# Patient Record
Sex: FEMALE | Race: WHITE | URBAN - METROPOLITAN AREA
[De-identification: names, ages, dates, MRNs, and addresses within clinical notes are randomized per-mention and may not be internally consistent; named-entity substitution may affect disease eponyms.]

---

## 2022-03-23 ENCOUNTER — OUTSIDE SERVICES (OUTPATIENT)
Dept: PODIATRY | Age: 57
End: 2022-03-23
Payer: COMMERCIAL

## 2022-03-23 DIAGNOSIS — S81.811A LACERATION OF RIGHT LOWER EXTREMITY, INITIAL ENCOUNTER: Primary | ICD-10-CM

## 2022-03-23 PROCEDURE — 99203 OFFICE O/P NEW LOW 30 MIN: CPT | Performed by: PODIATRIST

## 2023-10-10 ENCOUNTER — TELEPHONE (OUTPATIENT)
Dept: ORTHOPEDIC SURGERY | Facility: CLINIC | Age: 58
End: 2023-10-10
Payer: COMMERCIAL

## 2023-10-10 DIAGNOSIS — G89.4 CHRONIC PAIN SYNDROME: Primary | ICD-10-CM

## 2023-10-10 DIAGNOSIS — M79.18 MYOFASCIAL PAIN SYNDROME: ICD-10-CM

## 2023-10-10 DIAGNOSIS — M17.0 PRIMARY LOCALIZED OSTEOARTHRITIS OF KNEES, BILATERAL: ICD-10-CM

## 2023-10-10 DIAGNOSIS — M50.00 CERVICAL DISC DISORDER WITH MYELOPATHY: ICD-10-CM

## 2023-10-10 RX ORDER — LIDOCAINE HYDROCHLORIDE 20 MG/ML
1.25 SOLUTION OROPHARYNGEAL AS NEEDED
COMMUNITY
Start: 2022-07-12

## 2023-10-10 RX ORDER — GLYCERIN 1 G/1
1 SUPPOSITORY RECTAL DAILY
COMMUNITY
Start: 2023-01-13

## 2023-10-10 RX ORDER — AMPICILLIN TRIHYDRATE 500 MG
50 CAPSULE ORAL DAILY
COMMUNITY
Start: 2023-07-24

## 2023-10-10 RX ORDER — POTASSIUM CHLORIDE 20 MEQ/1
20 TABLET, EXTENDED RELEASE ORAL DAILY
COMMUNITY
Start: 2023-09-24

## 2023-10-10 RX ORDER — LORAZEPAM 0.5 MG/1
5 TABLET ORAL EVERY 6 HOURS PRN
COMMUNITY
End: 2023-10-25

## 2023-10-10 RX ORDER — DICLOFENAC SODIUM 10 MG/G
4 GEL TOPICAL 4 TIMES DAILY PRN
COMMUNITY
Start: 2023-07-17

## 2023-10-10 RX ORDER — ACETAMINOPHEN 500 MG
500 TABLET ORAL EVERY 6 HOURS PRN
COMMUNITY

## 2023-10-10 RX ORDER — PROCHLORPERAZINE MALEATE 10 MG
10 TABLET ORAL EVERY 6 HOURS PRN
COMMUNITY
Start: 2023-07-13

## 2023-10-10 RX ORDER — METOPROLOL SUCCINATE 25 MG/1
25 TABLET, EXTENDED RELEASE ORAL 2 TIMES DAILY
COMMUNITY

## 2023-10-10 RX ORDER — LIDOCAINE 4 G/100G
2 PATCH TOPICAL DAILY PRN
COMMUNITY
Start: 2023-07-17

## 2023-10-10 RX ORDER — ESTRADIOL 0.1 MG/G
2 CREAM VAGINAL DAILY
COMMUNITY
Start: 2022-11-08

## 2023-10-10 RX ORDER — ASPIRIN 81 MG/1
81 TABLET ORAL DAILY
COMMUNITY
Start: 2023-09-24

## 2023-10-10 RX ORDER — ALBUTEROL SULFATE 90 UG/1
1 AEROSOL, METERED RESPIRATORY (INHALATION) EVERY 4 HOURS PRN
COMMUNITY
Start: 2015-02-15

## 2023-10-10 RX ORDER — LOSARTAN POTASSIUM 25 MG/1
25 TABLET ORAL DAILY
COMMUNITY
Start: 2023-07-21

## 2023-10-10 RX ORDER — FLUTICASONE PROPIONATE 50 MCG
1 SPRAY, SUSPENSION (ML) NASAL DAILY
COMMUNITY
Start: 2023-09-18

## 2023-10-10 RX ORDER — DEXTROMETHORPHAN HB/DOXYLAMINE 15-6.25/15
1 SOLUTION, ORAL ORAL DAILY
COMMUNITY

## 2023-10-10 RX ORDER — MULTIVIT-MIN/FA/LYCOPEN/LUTEIN .4-300-25
1 TABLET ORAL DAILY
COMMUNITY
Start: 2023-06-26

## 2023-10-10 RX ORDER — NITROGLYCERIN 0.4 MG/1
0.4 TABLET SUBLINGUAL EVERY 5 MIN PRN
COMMUNITY

## 2023-10-10 RX ORDER — CYCLOBENZAPRINE HCL 10 MG
10 TABLET ORAL 3 TIMES DAILY PRN
COMMUNITY
Start: 2023-07-17

## 2023-10-10 RX ORDER — NALOXONE HYDROCHLORIDE 4 MG/.1ML
4 SPRAY NASAL AS NEEDED
COMMUNITY
Start: 2022-11-11

## 2023-10-10 RX ORDER — FLUCONAZOLE 100 MG/1
150 TABLET ORAL ONCE
COMMUNITY
Start: 2023-03-29

## 2023-10-10 RX ORDER — VENLAFAXINE 25 MG/1
25 TABLET ORAL 3 TIMES DAILY
COMMUNITY
Start: 2022-04-12

## 2023-10-10 RX ORDER — AMLODIPINE BESYLATE 5 MG/1
5 TABLET ORAL DAILY
COMMUNITY
Start: 2023-08-02

## 2023-10-10 RX ORDER — PANTOPRAZOLE SODIUM 40 MG/1
40 TABLET, DELAYED RELEASE ORAL
COMMUNITY
Start: 2022-08-26

## 2023-10-10 RX ORDER — METOPROLOL SUCCINATE 50 MG/1
1 TABLET, EXTENDED RELEASE ORAL DAILY
COMMUNITY
Start: 2012-07-25

## 2023-10-10 RX ORDER — SUMATRIPTAN SUCCINATE 25 MG/1
25 TABLET ORAL EVERY 2 HOUR PRN
COMMUNITY
End: 2024-03-11 | Stop reason: SDUPTHER

## 2023-10-10 RX ORDER — LORATADINE 10 MG/1
10 TABLET ORAL DAILY
COMMUNITY

## 2023-10-10 RX ORDER — SODIUM HYALURONATE INTRA-ARTICULAR SOLN PREF SYR 25 MG/2.5ML 25/2.5 MG/ML
20 SOLUTION PREFILLED SYRINGE INTRAARTICULAR
COMMUNITY
Start: 2022-12-12

## 2023-10-10 RX ORDER — OXYCODONE AND ACETAMINOPHEN 5; 325 MG/1; MG/1
1 TABLET ORAL EVERY 4 HOURS PRN
COMMUNITY
End: 2023-10-10 | Stop reason: SDUPTHER

## 2023-10-10 RX ORDER — OXYCODONE AND ACETAMINOPHEN 5; 325 MG/1; MG/1
2 TABLET ORAL EVERY 6 HOURS PRN
Qty: 240 TABLET | Refills: 0 | Status: SHIPPED | OUTPATIENT
Start: 2023-10-10 | End: 2023-10-11 | Stop reason: SDUPTHER

## 2023-10-10 RX ORDER — ACETAMINOPHEN AND CODEINE PHOSPHATE 300; 30 MG/1; MG/1
1 TABLET ORAL EVERY 6 HOURS PRN
COMMUNITY

## 2023-10-10 RX ORDER — ATORVASTATIN CALCIUM 20 MG/1
20 TABLET, FILM COATED ORAL DAILY
COMMUNITY

## 2023-10-10 RX ORDER — LACTOBACILLUS ACIDOPHILUS/PECT 75 MM-100
1 CAPSULE ORAL DAILY
COMMUNITY
Start: 2023-09-23
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
## 2023-10-11 ENCOUNTER — TELEPHONE (OUTPATIENT)
Dept: ORTHOPEDIC SURGERY | Facility: CLINIC | Age: 58
End: 2023-10-11
Payer: COMMERCIAL

## 2023-10-11 DIAGNOSIS — G89.4 CHRONIC PAIN SYNDROME: ICD-10-CM

## 2023-10-11 DIAGNOSIS — M50.00 CERVICAL DISC DISORDER WITH MYELOPATHY: ICD-10-CM

## 2023-10-11 DIAGNOSIS — M17.0 PRIMARY LOCALIZED OSTEOARTHRITIS OF KNEES, BILATERAL: ICD-10-CM

## 2023-10-11 DIAGNOSIS — M79.18 MYOFASCIAL PAIN SYNDROME: ICD-10-CM

## 2023-10-11 RX ORDER — OXYCODONE AND ACETAMINOPHEN 5; 325 MG/1; MG/1
2 TABLET ORAL EVERY 6 HOURS PRN
Qty: 240 TABLET | Refills: 0 | Status: SHIPPED | OUTPATIENT
Start: 2023-10-11 | End: 2023-10-31 | Stop reason: SDUPTHER

## 2023-10-11 NOTE — TELEPHONE ENCOUNTER
Ok I sent same Rx there.  Hope she can get there..  The message to clinic was just on a post-it note on Josefa's desk early this morning. We couldn't tell when she called.  staff here is new and didn't know who took it

## 2023-10-22 DIAGNOSIS — G44.86 CERVICOGENIC HEADACHE: ICD-10-CM

## 2023-10-22 NOTE — LETTER
October 25, 2023     Taryn Gonzalez  [address]  [facility] [address]    Patient: Taryn Gonzalez   YOB: 1965   Date of Visit: 10/22/2023       Dear Allyson -     I'm sorry to hear that your phone was disconnected.  Jen and I have been trying to reach you to schedule a follow-up.  I'm required by the [facility] medical board and  policy to see you at least once every 90 days if I am to continue prescribing controlled substances.  Please schedule a follow-up visit at your convenience before November 18th.  Jen and our new nurse Josefa Figueroa have my permission to overbook you into my schedule to accommodate this.  If you are unable to see me before then, I will start weaning your dose of all controlled substances so you can safely come off of them within about 60 days.  This would include oxycodone, lorazepam, etc.      Sincerely,     Tesfaye Messina MD

## 2023-10-25 RX ORDER — LORAZEPAM 0.5 MG/1
TABLET ORAL
Qty: 60 TABLET | Refills: 0 | Status: SHIPPED | OUTPATIENT
Start: 2023-10-25 | End: 2023-10-31 | Stop reason: SDUPTHER

## 2023-10-25 NOTE — TELEPHONE ENCOUNTER
Rx sent with note to pharmacist to tell patient we need to schedule appt before any more refills.  I'm trying to follow state board guidelines but she cannot take calls - per previous messages her phone was disconnected.  I'll send an letter by post mail also.   Josefa - please print the draft I routed you and mail to patient from office

## 2023-10-31 ENCOUNTER — TELEPHONE (OUTPATIENT)
Dept: ORTHOPEDIC SURGERY | Facility: CLINIC | Age: 58
End: 2023-10-31
Payer: COMMERCIAL

## 2023-10-31 DIAGNOSIS — M79.18 MYOFASCIAL PAIN SYNDROME: ICD-10-CM

## 2023-10-31 DIAGNOSIS — G89.4 CHRONIC PAIN SYNDROME: ICD-10-CM

## 2023-10-31 DIAGNOSIS — M17.0 PRIMARY LOCALIZED OSTEOARTHRITIS OF KNEES, BILATERAL: ICD-10-CM

## 2023-10-31 DIAGNOSIS — G44.86 CERVICOGENIC HEADACHE: ICD-10-CM

## 2023-10-31 DIAGNOSIS — M50.00 CERVICAL DISC DISORDER WITH MYELOPATHY: ICD-10-CM

## 2023-10-31 RX ORDER — OXYCODONE AND ACETAMINOPHEN 5; 325 MG/1; MG/1
2 TABLET ORAL EVERY 6 HOURS PRN
Qty: 240 TABLET | Refills: 0 | Status: SHIPPED | OUTPATIENT
Start: 2023-10-31 | End: 2023-12-08

## 2023-10-31 RX ORDER — LORAZEPAM 0.5 MG/1
TABLET ORAL
Qty: 90 TABLET | Refills: 0 | Status: SHIPPED | OUTPATIENT
Start: 2023-11-10 | End: 2023-12-08

## 2023-11-01 NOTE — TELEPHONE ENCOUNTER
DUTCHI -  No further action needed now  Pt returned my call about refills and need for apt.   Very hard for her to get rides and limited phone access so missed recent apts.  I told her I would overbook next time at [place], even that day she called, and gave her dates the next 3weeks when I'm there 6, 13 and 20th.   But told her it would have to be a very fast focused apt. She mentioned at least 3 different problems but wants to focus on head pain. Also Wanted more frequent Sig on lorazepam even tho not taking that much, just in case she gets admitted or incarcerated, they'll know she can have more and she'd need it then.  Also says percocet was shorted by pharmacy on recent supply and I veirfied that 10/11 she only get 191 tabs instead of 240. I reminded her that she's supposed to be tapering very soon anyways but again said she has intolerable pain and gave multiple reasons.   Had made 3 future apts  so will discuss then.   No further action needed now

## 2023-11-08 ENCOUNTER — TELEPHONE (OUTPATIENT)
Dept: ORTHOPEDIC SURGERY | Facility: CLINIC | Age: 58
End: 2023-11-08
Payer: COMMERCIAL

## 2023-11-27 ENCOUNTER — TELEPHONE (OUTPATIENT)
Dept: ORTHOPEDIC SURGERY | Facility: CLINIC | Age: 58
End: 2023-11-27
Payer: COMMERCIAL

## 2023-11-27 NOTE — TELEPHONE ENCOUNTER
"Returned pt phone call on Nov 22 but forgot to document until now - she had called  directly and they gave me a post-it note instead of epic message.  She had said \"urgent\".  After a very long and rambling story about her family and house situation she told me that she thinks her pain and skin color changes are due to infection with \"Spider mites\" and live adult stink bugs crawling all over inside her body.  She had already called her infectious disease doctor at [facility] and was waiting for callback from him on same day.  I politely told her that this would not be my area of expertise, but I highly doubted that diagnosis.  I did tell her that it may be a paranoia, and that after she gets more testing (if directed by ID) that she should consider talking to a psychiatrist, which she flatly refused.   I also told her she could tell her primary care doctor about this but she also refused saying I'm the only one who would listen to her.    She said was still able to do work around the house and go to the store.     She also mentioned that she thinks she hit her head again on the beam in her hallway, like she does \"all the time\" and this may explain her headaches.  But could not recall any specific event recently.     [facility] \"[facility]\" [facility] on Lorazepam, but she able to make 60tabs last Oct 25-Mid November.  Taking 3/d at most sometimes less.    She seemed level-headed and able to care for herself, fully oriented otherwise so I didn't send her to the ED.   But I did reminder her that I needed to see her in office in order to continue prescribing her meds, and reminded my nurse and  that they are allowed to overbook her into my schedule anytime she can get a ride to office.  I also told her that if I didn't see her by Dec 18th as per previous plan I would start weaning her off of her controlled substances.  I reminded her of upcoming apts  "

## 2023-12-07 DIAGNOSIS — M50.00 CERVICAL DISC DISORDER WITH MYELOPATHY: ICD-10-CM

## 2023-12-07 DIAGNOSIS — M79.18 MYOFASCIAL PAIN SYNDROME: ICD-10-CM

## 2023-12-07 DIAGNOSIS — M17.0 PRIMARY LOCALIZED OSTEOARTHRITIS OF KNEES, BILATERAL: ICD-10-CM

## 2023-12-07 DIAGNOSIS — G89.4 CHRONIC PAIN SYNDROME: ICD-10-CM

## 2023-12-07 DIAGNOSIS — G44.86 CERVICOGENIC HEADACHE: ICD-10-CM

## 2023-12-08 RX ORDER — OXYCODONE AND ACETAMINOPHEN 5; 325 MG/1; MG/1
2 TABLET ORAL EVERY 6 HOURS PRN
Qty: 240 TABLET | Refills: 0 | Status: SHIPPED | OUTPATIENT
Start: 2023-12-08 | End: 2024-01-02 | Stop reason: SDUPTHER

## 2023-12-08 RX ORDER — LORAZEPAM 0.5 MG/1
TABLET ORAL
Qty: 90 TABLET | Refills: 0 | Status: SHIPPED | OUTPATIENT
Start: 2023-12-08 | End: 2024-01-17

## 2023-12-29 ENCOUNTER — TELEPHONE (OUTPATIENT)
Dept: ORTHOPEDIC SURGERY | Facility: CLINIC | Age: 58
End: 2023-12-29
Payer: COMMERCIAL

## 2023-12-29 DIAGNOSIS — M79.18 MYOFASCIAL PAIN SYNDROME: ICD-10-CM

## 2023-12-29 DIAGNOSIS — M50.00 CERVICAL DISC DISORDER WITH MYELOPATHY: ICD-10-CM

## 2023-12-29 DIAGNOSIS — G89.4 CHRONIC PAIN SYNDROME: ICD-10-CM

## 2023-12-29 DIAGNOSIS — M17.0 PRIMARY LOCALIZED OSTEOARTHRITIS OF KNEES, BILATERAL: ICD-10-CM

## 2023-12-29 NOTE — TELEPHONE ENCOUNTER
New phone number [phone]  Pt left voicemail on my personal phoneat ~1:20pm  (the one I told her never to call again), but said she was without cell phone again until just now and needs medication advice .  Said she was not able to call office but  didn't say why.   Medication supply looks OK thru next week but will probably run out before next visit on [facility].  There was no urgency in her message  Josefa - I hate to bother  you but need to remind this patient of proper channels and appropriate communications.  She should NEVER be calling my personal number.  Please reassure her that I'll give refills when due, but if serious questions make virtual visit for next week (Tues or Weds OK) and let me know if anhything more urgent.  Keep Stef 10 apt we can use in case she needs physical exam, as Id still very much prefer to see her in person. . New ph # above, please update demographics, I dont know how and still at [facility] seeing consults.  Crazy day again

## 2024-01-02 RX ORDER — OXYCODONE AND ACETAMINOPHEN 5; 325 MG/1; MG/1
2 TABLET ORAL EVERY 6 HOURS PRN
Qty: 240 TABLET | Refills: 0 | Status: SHIPPED | OUTPATIENT
Start: 2024-01-02 | End: 2024-01-31
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
## 2024-01-04 ENCOUNTER — TELEMEDICINE (OUTPATIENT)
Dept: ORTHOPEDIC SURGERY | Facility: CLINIC | Age: 59
End: 2024-01-04
Payer: COMMERCIAL

## 2024-01-04 DIAGNOSIS — G89.4 CHRONIC PAIN SYNDROME: Primary | ICD-10-CM

## 2024-01-04 PROCEDURE — 99213 OFFICE O/P EST LOW 20 MIN: CPT | Performed by: PHYSICAL MEDICINE & REHABILITATION
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
## 2024-01-10 ENCOUNTER — OFFICE VISIT (OUTPATIENT)
Dept: ORTHOPEDIC SURGERY | Facility: CLINIC | Age: 59
End: 2024-01-10
Payer: COMMERCIAL

## 2024-01-10 ENCOUNTER — TELEPHONE (OUTPATIENT)
Dept: ORTHOPEDIC SURGERY | Facility: CLINIC | Age: 59
End: 2024-01-10

## 2024-01-10 DIAGNOSIS — M17.0 PRIMARY OSTEOARTHRITIS OF BOTH KNEES: ICD-10-CM

## 2024-01-10 DIAGNOSIS — G89.4 CHRONIC PAIN SYNDROME: Primary | ICD-10-CM

## 2024-01-10 DIAGNOSIS — M50.00 HERNIATION OF INTERVERTEBRAL DISC OF CERVICAL SPINE WITH MYELOPATHY: ICD-10-CM

## 2024-01-10 DIAGNOSIS — M75.111 NONTRAUMATIC INCOMPLETE TEAR OF RIGHT ROTATOR CUFF: ICD-10-CM

## 2024-01-10 PROCEDURE — 99442 PR PHYS/QHP TELEPHONE EVALUATION 11-20 MIN: CPT | Performed by: PHYSICAL MEDICINE & REHABILITATION
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
## 2024-01-17 DIAGNOSIS — G44.86 CERVICOGENIC HEADACHE: ICD-10-CM

## 2024-01-17 RX ORDER — LORAZEPAM 0.5 MG/1
TABLET ORAL
Qty: 90 TABLET | Refills: 0 | Status: SHIPPED | OUTPATIENT
Start: 2024-01-17 | End: 2024-01-19 | Stop reason: SDUPTHER

## 2024-01-19 ENCOUNTER — TELEPHONE (OUTPATIENT)
Dept: ORTHOPEDIC SURGERY | Facility: CLINIC | Age: 59
End: 2024-01-19
Payer: COMMERCIAL

## 2024-01-19 DIAGNOSIS — G44.86 CERVICOGENIC HEADACHE: ICD-10-CM

## 2024-01-19 RX ORDER — LORAZEPAM 0.5 MG/1
TABLET ORAL
Qty: 90 TABLET | Refills: 0 | Status: SHIPPED | OUTPATIENT
Start: 2024-01-19 | End: 2024-03-01 | Stop reason: SDUPTHER

## 2024-01-19 NOTE — TELEPHONE ENCOUNTER
----- Message from Josefa Guo RN sent at 1/19/2024 12:51 PM EST -----  Taryn Mosher called and deeply apologetic for her previous behavior and did not mean any disrespect to you.  Of course, it is close to time for a refill. Please advise what you would like me to do so I can call her back.  Thank you,  Josefa

## 2024-01-19 NOTE — TELEPHONE ENCOUNTER
Starting phone encounter for documentation.  She's not due for anything and I still can't prescribe that much oxycodone and benzo without seeing her and screening urine. The only safe thing to do is continue taper.  And that was plan for her also.  I refilled Lorazepam last week but looks like she didn't fill it yet - I;ll send again just to be sure.   That's probably what she wants.     BUT Please tell her to wean percocet to 6 tabs per day and see me in office.  She got #240 percocet just 2 weeks ago.    If she can't see me we'll keep weaning down.  I'll squeeze her in anytime I'm in clinic - at her convenience to re-evaluate and discuss further.

## 2024-01-31 DIAGNOSIS — M17.0 PRIMARY LOCALIZED OSTEOARTHRITIS OF KNEES, BILATERAL: ICD-10-CM

## 2024-01-31 DIAGNOSIS — M50.00 CERVICAL DISC DISORDER WITH MYELOPATHY: ICD-10-CM

## 2024-01-31 DIAGNOSIS — G89.4 CHRONIC PAIN SYNDROME: ICD-10-CM

## 2024-01-31 DIAGNOSIS — M79.18 MYOFASCIAL PAIN SYNDROME: ICD-10-CM

## 2024-01-31 RX ORDER — OXYCODONE AND ACETAMINOPHEN 5; 325 MG/1; MG/1
1 TABLET ORAL EVERY 4 HOURS PRN
Qty: 180 TABLET | Refills: 0 | Status: SHIPPED | OUTPATIENT
Start: 2024-01-31 | End: 2024-03-01 | Stop reason: SDUPTHER

## 2024-02-05 ENCOUNTER — TELEPHONE (OUTPATIENT)
Dept: ORTHOPEDIC SURGERY | Facility: CLINIC | Age: 59
End: 2024-02-05
Payer: COMMERCIAL

## 2024-02-05 NOTE — TELEPHONE ENCOUNTER
No pedialyte would need to come from her PCP.  I'll look at low back next time I see her but she has option of going to PCP or ED

## 2024-02-12 ENCOUNTER — APPOINTMENT (OUTPATIENT)
Dept: ORTHOPEDIC SURGERY | Facility: CLINIC | Age: 59
End: 2024-02-12
Payer: COMMERCIAL

## 2024-02-19 ENCOUNTER — TELEPHONE (OUTPATIENT)
Dept: ORTHOPEDIC SURGERY | Facility: CLINIC | Age: 59
End: 2024-02-19
Payer: COMMERCIAL

## 2024-02-19 NOTE — TELEPHONE ENCOUNTER
"Called to schedule patient for a face to face appointment with Dr Messina and was informed that she is \"house bound\" and cannot walk. Patient encouraged to go to the nearest emergency room.    "

## 2024-02-20 ENCOUNTER — TELEPHONE (OUTPATIENT)
Dept: OPERATING ROOM | Facility: CLINIC | Age: 59
End: 2024-02-20
Payer: COMMERCIAL

## 2024-02-20 DIAGNOSIS — G44.86 CERVICOGENIC HEADACHE: ICD-10-CM

## 2024-02-20 DIAGNOSIS — M79.18 MYOFASCIAL PAIN SYNDROME: ICD-10-CM

## 2024-02-20 DIAGNOSIS — M17.0 PRIMARY LOCALIZED OSTEOARTHRITIS OF KNEES, BILATERAL: ICD-10-CM

## 2024-02-20 DIAGNOSIS — M50.00 CERVICAL DISC DISORDER WITH MYELOPATHY: ICD-10-CM

## 2024-02-20 DIAGNOSIS — G89.4 CHRONIC PAIN SYNDROME: ICD-10-CM
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
## 2024-02-20 NOTE — TELEPHONE ENCOUNTER
Josefa Figueroa said she is calling multiple times per day.  The ortho clinic isn't even supposed to take these calls.    Please tell her that I won't go down any further on her meds, but she's not due until March 1st at earliest and I WILL NOT (tell her it's capitalized), give any earlier than that.  And if she does't come in person to next visit I'll need to keep weaning down.  She needs to stretch whatever she has left

## 2024-02-27 ENCOUNTER — TELEPHONE (OUTPATIENT)
Dept: OPERATING ROOM | Facility: CLINIC | Age: 59
End: 2024-02-27
Payer: COMMERCIAL

## 2024-02-27 NOTE — TELEPHONE ENCOUNTER
She wants to know if she can do a virtual on a friends phone? She cannot walk and tailbone on both sides of butt - she fell on wood floor.  I thought you needed to see her in  person.   Wanted to see you tomorrow told her you were in [facility]

## 2024-02-29 ENCOUNTER — APPOINTMENT (OUTPATIENT)
Dept: ORTHOPEDIC SURGERY | Facility: CLINIC | Age: 59
End: 2024-02-29
Payer: COMMERCIAL

## 2024-02-29 DIAGNOSIS — M50.00 CERVICAL DISC DISORDER WITH MYELOPATHY: ICD-10-CM

## 2024-02-29 DIAGNOSIS — M17.0 PRIMARY LOCALIZED OSTEOARTHRITIS OF KNEES, BILATERAL: ICD-10-CM

## 2024-02-29 DIAGNOSIS — G89.4 CHRONIC PAIN SYNDROME: ICD-10-CM

## 2024-02-29 DIAGNOSIS — M79.18 MYOFASCIAL PAIN SYNDROME: ICD-10-CM

## 2024-02-29 DIAGNOSIS — G44.86 CERVICOGENIC HEADACHE: ICD-10-CM

## 2024-03-01 RX ORDER — OXYCODONE AND ACETAMINOPHEN 5; 325 MG/1; MG/1
TABLET ORAL
Qty: 180 TABLET | Refills: 0 | OUTPATIENT
Start: 2024-03-01

## 2024-03-01 RX ORDER — OXYCODONE AND ACETAMINOPHEN 5; 325 MG/1; MG/1
1 TABLET ORAL EVERY 4 HOURS PRN
Qty: 180 TABLET | Refills: 0 | Status: SHIPPED | OUTPATIENT
Start: 2024-03-01 | End: 2024-03-11 | Stop reason: SDUPTHER

## 2024-03-01 RX ORDER — LORAZEPAM 0.5 MG/1
TABLET ORAL
Qty: 90 TABLET | Refills: 0 | OUTPATIENT
Start: 2024-03-01

## 2024-03-01 RX ORDER — LORAZEPAM 0.5 MG/1
TABLET ORAL
Qty: 90 TABLET | Refills: 0 | Status: SHIPPED | OUTPATIENT
Start: 2024-03-01 | End: 2024-03-11 | Stop reason: SDUPTHER
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
## 2024-03-07 ENCOUNTER — TELEPHONE (OUTPATIENT)
Dept: OPERATING ROOM | Facility: CLINIC | Age: 59
End: 2024-03-07
Payer: COMMERCIAL
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
## 2024-03-11 ENCOUNTER — LAB (OUTPATIENT)
Dept: LAB | Facility: LAB | Age: 59
End: 2024-03-11
Payer: COMMERCIAL

## 2024-03-11 ENCOUNTER — OFFICE VISIT (OUTPATIENT)
Dept: ORTHOPEDIC SURGERY | Facility: CLINIC | Age: 59
End: 2024-03-11
Payer: COMMERCIAL

## 2024-03-11 DIAGNOSIS — M79.18 MYOFASCIAL PAIN SYNDROME: ICD-10-CM

## 2024-03-11 DIAGNOSIS — M17.0 PRIMARY LOCALIZED OSTEOARTHRITIS OF KNEES, BILATERAL: ICD-10-CM

## 2024-03-11 DIAGNOSIS — G44.86 CERVICOGENIC HEADACHE: ICD-10-CM

## 2024-03-11 DIAGNOSIS — F11.20 OPIOID DEPENDENCE WITH CURRENT USE (MULTI): ICD-10-CM

## 2024-03-11 DIAGNOSIS — G43.909 MIGRAINE SYNDROME: Primary | ICD-10-CM

## 2024-03-11 DIAGNOSIS — G89.4 CHRONIC PAIN SYNDROME: ICD-10-CM

## 2024-03-11 DIAGNOSIS — M50.00 CERVICAL DISC DISORDER WITH MYELOPATHY: ICD-10-CM

## 2024-03-11 LAB
AMPHETAMINES UR QL SCN: NORMAL
BARBITURATES UR QL SCN: NORMAL
BZE UR QL SCN: NORMAL
CANNABINOIDS UR QL SCN: NORMAL
CREAT UR-MCNC: 25.6 MG/DL (ref 20–320)
PCP UR QL SCN: NORMAL

## 2024-03-11 PROCEDURE — 80361 OPIATES 1 OR MORE: CPT

## 2024-03-11 PROCEDURE — 99215 OFFICE O/P EST HI 40 MIN: CPT | Performed by: PHYSICAL MEDICINE & REHABILITATION

## 2024-03-11 PROCEDURE — 80365 DRUG SCREENING OXYCODONE: CPT

## 2024-03-11 PROCEDURE — 80307 DRUG TEST PRSMV CHEM ANLYZR: CPT

## 2024-03-11 PROCEDURE — 80373 DRUG SCREENING TRAMADOL: CPT

## 2024-03-11 PROCEDURE — 80346 BENZODIAZEPINES1-12: CPT

## 2024-03-11 PROCEDURE — 80358 DRUG SCREENING METHADONE: CPT

## 2024-03-11 PROCEDURE — 82570 ASSAY OF URINE CREATININE: CPT

## 2024-03-11 PROCEDURE — 80354 DRUG SCREENING FENTANYL: CPT

## 2024-03-11 PROCEDURE — 80368 SEDATIVE HYPNOTICS: CPT

## 2024-03-11 RX ORDER — DIAZEPAM 5 MG/1
TABLET ORAL
Qty: 6 TABLET | Refills: 0 | Status: SHIPPED | OUTPATIENT
Start: 2024-03-11 | End: 2024-04-17 | Stop reason: SDUPTHER

## 2024-03-11 RX ORDER — SUMATRIPTAN SUCCINATE 25 MG/1
25 TABLET ORAL EVERY 2 HOUR PRN
Qty: 9 TABLET | Refills: 1 | Status: SHIPPED | OUTPATIENT
Start: 2024-03-11

## 2024-03-11 RX ORDER — OXYCODONE AND ACETAMINOPHEN 5; 325 MG/1; MG/1
1 TABLET ORAL EVERY 4 HOURS PRN
Qty: 240 TABLET | Refills: 0 | Status: SHIPPED | OUTPATIENT
Start: 2024-03-30 | End: 2024-03-21 | Stop reason: WASHOUT

## 2024-03-11 RX ORDER — LORAZEPAM 0.5 MG/1
TABLET ORAL
Qty: 90 TABLET | Refills: 0 | Status: SHIPPED | OUTPATIENT
Start: 2024-03-30 | End: 2024-04-25 | Stop reason: SDUPTHER
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
## 2024-03-11 NOTE — PROGRESS NOTES
Referral for Physical Therapy Faxed to [facility] Taryn Day per patient request.

## 2024-03-14 LAB
1OH-MIDAZOLAM UR CFM-MCNC: <25 NG/ML
6MAM UR CFM-MCNC: <25 NG/ML
7AMINOCLONAZEPAM UR CFM-MCNC: <25 NG/ML
A-OH ALPRAZ UR CFM-MCNC: <25 NG/ML
ALPRAZ UR CFM-MCNC: <25 NG/ML
CHLORDIAZEP UR CFM-MCNC: <25 NG/ML
CLONAZEPAM UR CFM-MCNC: <25 NG/ML
CODEINE UR CFM-MCNC: <50 NG/ML
DIAZEPAM UR CFM-MCNC: <25 NG/ML
EDDP UR CFM-MCNC: <25 NG/ML
FENTANYL UR CFM-MCNC: <2.5 NG/ML
HYDROCODONE CTO UR CFM-MCNC: <25 NG/ML
HYDROMORPHONE UR CFM-MCNC: <25 NG/ML
LORAZEPAM UR CFM-MCNC: 180 NG/ML
METHADONE UR CFM-MCNC: <25 NG/ML
MIDAZOLAM UR CFM-MCNC: <25 NG/ML
MORPHINE UR CFM-MCNC: <50 NG/ML
NORDIAZEPAM UR CFM-MCNC: <25 NG/ML
NORFENTANYL UR CFM-MCNC: <2.5 NG/ML
NORHYDROCODONE UR CFM-MCNC: <25 NG/ML
NOROXYCODONE UR CFM-MCNC: 442 NG/ML
NORTRAMADOL UR-MCNC: <50 NG/ML
OXAZEPAM UR CFM-MCNC: <25 NG/ML
OXYCODONE UR CFM-MCNC: 750 NG/ML
OXYMORPHONE UR CFM-MCNC: 1497 NG/ML
TEMAZEPAM UR CFM-MCNC: <25 NG/ML
TRAMADOL UR CFM-MCNC: <50 NG/ML
ZOLPIDEM UR CFM-MCNC: <25 NG/ML
ZOLPIDEM UR-MCNC: <25 NG/ML

## 2024-03-15 ENCOUNTER — TELEPHONE (OUTPATIENT)
Dept: OPERATING ROOM | Facility: CLINIC | Age: 59
End: 2024-03-15
Payer: COMMERCIAL

## 2024-03-15 NOTE — TELEPHONE ENCOUNTER
EMANUEL   She thought you were going to do an mri on her head. I told her what you ordered and said that you are not a head doctor so you wouldn't be ordering something like that.

## 2024-03-15 NOTE — TELEPHONE ENCOUNTER
Correct - not head just Cspine.  She spend considerable amount of time trying to talk me into ordering mRI for low back hips and shoulders also.  The Cervical and Pelvis are all that are indicated.  She should make apt with neurologist.  I told her that me ordering the migraine meds were a one-time only exception

## 2024-03-20 ENCOUNTER — TELEPHONE (OUTPATIENT)
Dept: OPERATING ROOM | Facility: CLINIC | Age: 59
End: 2024-03-20
Payer: COMMERCIAL

## 2024-03-20 ENCOUNTER — TELEPHONE (OUTPATIENT)
Dept: ORTHOPEDIC SURGERY | Facility: CLINIC | Age: 59
End: 2024-03-20
Payer: COMMERCIAL

## 2024-03-20 DIAGNOSIS — M17.0 PRIMARY LOCALIZED OSTEOARTHRITIS OF KNEES, BILATERAL: ICD-10-CM

## 2024-03-20 DIAGNOSIS — F11.20 OPIOID DEPENDENCE WITH CURRENT USE (MULTI): ICD-10-CM

## 2024-03-20 DIAGNOSIS — G44.86 CERVICOGENIC HEADACHE: ICD-10-CM

## 2024-03-20 DIAGNOSIS — M79.18 MYOFASCIAL PAIN SYNDROME: ICD-10-CM

## 2024-03-20 DIAGNOSIS — G89.4 CHRONIC PAIN SYNDROME: ICD-10-CM

## 2024-03-20 DIAGNOSIS — M50.00 CERVICAL DISC DISORDER WITH MYELOPATHY: ICD-10-CM

## 2024-03-20 NOTE — TELEPHONE ENCOUNTER
Spoke to Taryn to let her know her urine toxicology results.  She is requesting that Dr. Messina refill her medication and increase her narcotic dosage as opposed to weaning her down.  She also let this nurse know that she will not be able to keep appointment for next month as her feet are swollen and she cannot wear shoes.

## 2024-03-20 NOTE — TELEPHONE ENCOUNTER
Calling for urine results. Said she left a message for Josefa but hasn't heard back.   I told her you're at the hospital and Josefa has patients and she has to have patience.     She has to talk to you about tylenol and Excedrin. Doesn't want to talk to Josefa about the meds, she needs to talk to you.     3/25/24    Said you put a code for her percocet rx that says opioid drug abuse.   The [facility] to talk to you before they will fill it.

## 2024-03-21 DIAGNOSIS — M17.0 OSTEOARTHRITIS OF BOTH KNEES, UNSPECIFIED OSTEOARTHRITIS TYPE: ICD-10-CM

## 2024-03-21 RX ORDER — OXYCODONE AND ACETAMINOPHEN 5; 325 MG/1; MG/1
1 TABLET ORAL EVERY 4 HOURS PRN
Qty: 240 TABLET | Refills: 0 | Status: SHIPPED | OUTPATIENT
Start: 2024-03-21 | End: 2024-03-22 | Stop reason: SDUPTHER

## 2024-03-21 NOTE — PROGRESS NOTES
"Spoke to Taryn on phone today regarding need for early release of Percocet due to increase in kenia LE pain and having a \"procedure on her legs\" on 3/30/24.  Spoke to  and new order for release of Percocet today was submitted, however, Taryn is aware that her pharmacy may not release this medication prior to 3/30/24.  "

## 2024-03-22 RX ORDER — OXYCODONE AND ACETAMINOPHEN 5; 325 MG/1; MG/1
2 TABLET ORAL EVERY 6 HOURS PRN
Qty: 240 TABLET | Refills: 0 | Status: SHIPPED | OUTPATIENT
Start: 2024-03-22 | End: 2024-03-26 | Stop reason: SDUPTHER

## 2024-03-26 ENCOUNTER — TELEPHONE (OUTPATIENT)
Dept: OPERATING ROOM | Facility: CLINIC | Age: 59
End: 2024-03-26
Payer: COMMERCIAL

## 2024-03-26 DIAGNOSIS — G44.86 CERVICOGENIC HEADACHE: ICD-10-CM

## 2024-03-26 DIAGNOSIS — M79.18 MYOFASCIAL PAIN SYNDROME: ICD-10-CM

## 2024-03-26 DIAGNOSIS — G89.4 CHRONIC PAIN SYNDROME: ICD-10-CM

## 2024-03-26 DIAGNOSIS — M17.0 PRIMARY LOCALIZED OSTEOARTHRITIS OF KNEES, BILATERAL: ICD-10-CM

## 2024-03-26 DIAGNOSIS — M50.00 CERVICAL DISC DISORDER WITH MYELOPATHY: ICD-10-CM

## 2024-03-26 RX ORDER — OXYCODONE AND ACETAMINOPHEN 5; 325 MG/1; MG/1
1 TABLET ORAL EVERY 4 HOURS PRN
Qty: 180 TABLET | Refills: 0 | Status: SHIPPED | OUTPATIENT
Start: 2024-03-26 | End: 2024-04-19 | Stop reason: SDUPTHER

## 2024-03-26 NOTE — TELEPHONE ENCOUNTER
Called pharmacist re their refusal to fill #240 percocet.  Anika at [facility].  She did try talking to pt about methadone and suboxone but got same refusal I did last year - pt concerned it will kill her despite rationale explained carefully.  I agree with phamacist re 180/month max not 240. Resent Rx.  Called patient explain plan and pt reluctantly agreeable but I needed to redirect conversation repeatedly.    DANITA Li and Josefa johnson only

## 2024-03-29 ENCOUNTER — APPOINTMENT (OUTPATIENT)
Dept: RADIOLOGY | Facility: CLINIC | Age: 59
End: 2024-03-29
Payer: COMMERCIAL

## 2024-04-02 ENCOUNTER — APPOINTMENT (OUTPATIENT)
Dept: RADIOLOGY | Facility: CLINIC | Age: 59
End: 2024-04-02
Payer: COMMERCIAL

## 2024-04-15 ENCOUNTER — TELEPHONE (OUTPATIENT)
Dept: OPERATING ROOM | Facility: CLINIC | Age: 59
End: 2024-04-15
Payer: COMMERCIAL
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
## 2024-04-16 NOTE — TELEPHONE ENCOUNTER
"Sorry I missed this yesterday - once again didn't show as \"new\" in my inbox. Needs to be a virtual visit. No way it'll take <5min.    "

## 2024-04-17 ENCOUNTER — TELEPHONE (OUTPATIENT)
Dept: OPERATING ROOM | Facility: CLINIC | Age: 59
End: 2024-04-17

## 2024-04-17 ENCOUNTER — TELEMEDICINE (OUTPATIENT)
Dept: ORTHOPEDIC SURGERY | Facility: CLINIC | Age: 59
End: 2024-04-17
Payer: COMMERCIAL

## 2024-04-17 DIAGNOSIS — F11.20 OPIOID DEPENDENCE WITH CURRENT USE (MULTI): ICD-10-CM

## 2024-04-17 DIAGNOSIS — G44.86 CERVICOGENIC HEADACHE: ICD-10-CM

## 2024-04-17 DIAGNOSIS — M50.00 CERVICAL DISC DISORDER WITH MYELOPATHY: Primary | ICD-10-CM

## 2024-04-17 DIAGNOSIS — G89.4 CHRONIC PAIN SYNDROME: ICD-10-CM

## 2024-04-17 DIAGNOSIS — M17.0 PRIMARY LOCALIZED OSTEOARTHRITIS OF KNEES, BILATERAL: ICD-10-CM

## 2024-04-17 DIAGNOSIS — M79.18 MYOFASCIAL PAIN SYNDROME: ICD-10-CM

## 2024-04-17 DIAGNOSIS — G43.909 MIGRAINE SYNDROME: ICD-10-CM

## 2024-04-17 DIAGNOSIS — M17.0 PRIMARY OSTEOARTHRITIS OF BOTH KNEES: ICD-10-CM

## 2024-04-17 PROCEDURE — 99214 OFFICE O/P EST MOD 30 MIN: CPT | Performed by: PHYSICAL MEDICINE & REHABILITATION

## 2024-04-17 RX ORDER — DIAZEPAM 5 MG/1
TABLET ORAL
Qty: 6 TABLET | Refills: 0 | Status: SHIPPED | OUTPATIENT
Start: 2024-04-17 | End: 2024-04-25 | Stop reason: SDUPTHER

## 2024-04-17 NOTE — TELEPHONE ENCOUNTER
"They can only do MRI's separately and she may be in a mobile mri for the tailbone.   She can't find the latest valium rx you gave her but did find one from 4/1/22 with 5 left. They are falling apart and in \"pieces and parts\".   So if she could get a script for tomorrow and another one for the 23rd but they might get her in earlier. Or if you just give her 1 script for both.     She is scared to death to take them with the stoma.     I told her I had my last mri in a mobile and I liked it better. The room was smaller and it made the mri machine feel bigger and it was less of a sterile environment than in a regular room.   "
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
## 2024-04-18 ENCOUNTER — HOSPITAL ENCOUNTER (OUTPATIENT)
Dept: RADIOLOGY | Facility: CLINIC | Age: 59
Discharge: HOME | End: 2024-04-18
Payer: COMMERCIAL

## 2024-04-18 DIAGNOSIS — M50.00 CERVICAL DISC DISORDER WITH MYELOPATHY: ICD-10-CM

## 2024-04-18 PROCEDURE — 72141 MRI NECK SPINE W/O DYE: CPT | Performed by: RADIOLOGY

## 2024-04-18 PROCEDURE — 72141 MRI NECK SPINE W/O DYE: CPT

## 2024-04-19 ENCOUNTER — TELEPHONE (OUTPATIENT)
Dept: OPERATING ROOM | Facility: CLINIC | Age: 59
End: 2024-04-19
Payer: COMMERCIAL

## 2024-04-19 DIAGNOSIS — G44.86 CERVICOGENIC HEADACHE: ICD-10-CM

## 2024-04-19 DIAGNOSIS — M17.0 PRIMARY LOCALIZED OSTEOARTHRITIS OF KNEES, BILATERAL: ICD-10-CM

## 2024-04-19 DIAGNOSIS — M50.00 CERVICAL DISC DISORDER WITH MYELOPATHY: ICD-10-CM

## 2024-04-19 DIAGNOSIS — M79.18 MYOFASCIAL PAIN SYNDROME: ICD-10-CM

## 2024-04-19 RX ORDER — OXYCODONE AND ACETAMINOPHEN 5; 325 MG/1; MG/1
1 TABLET ORAL EVERY 4 HOURS PRN
Qty: 180 TABLET | Refills: 0 | Status: SHIPPED | OUTPATIENT
Start: 2024-04-26 | End: 2024-04-24 | Stop reason: SDUPTHER
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
## 2024-04-19 NOTE — TELEPHONE ENCOUNTER
Long d/w patient re MRI experience and results.  Agree worse C5 and C6 disc especially right C6 compone but cord deformity looks similar and no edema.  I still think she needs surgery but She refused to see spine surgery c/s  yet despite my education about cord compression and permanent nerve damage.  Wants to get MRI pelvis next week then think about getting affairs in order and help arranged.  Educated on sx to watch for re myelopathy and to go to ED if any worse.  Meanwhile will inc prednisone to 60mg x 1 day then 40mg x2 days then 20mg x2 days then stop.  Advised in-person followup 5/8 to check nerve function.

## 2024-04-22 ENCOUNTER — TELEPHONE (OUTPATIENT)
Dept: OPERATING ROOM | Facility: CLINIC | Age: 59
End: 2024-04-22

## 2024-04-22 DIAGNOSIS — M50.00 HERNIATION OF INTERVERTEBRAL DISC OF CERVICAL SPINE WITH MYELOPATHY: Primary | ICD-10-CM

## 2024-04-22 NOTE — TELEPHONE ENCOUNTER
"D/w pt.  Took almost 30min on phone but eventually I convinced her to schedule spine surgery -but she said she couldn't write down names and she'd call you back tomorrow -   Have her see Miguel Sebastian or Talon Gale at [facility], if not them she'll consider going back to [facility] to see Santos Jarrett (he offered her neck surgery twice before when I was there).      Otherwise she wanted me to document that she's having more \"spots in eyes both black and white - comes with headache and after she goes in dark room.\"  Acuity sounds unchanged.  I suspect migraine.  I reminded her that I want her to f/u with neurology about headaches   She just wanted to document everything going on with her in case she's incapacitated from spine surgery and doesn't make it to neurology.  Says hands still weak since pain exacerbation during MRI but not worse - actually thinks handwriting is improved.   Still feels unable to wean prednisone but told her to try 40mg tomorrow.    Said she can't navigate [employer] website system to get name of supplier for walker, wants help.  I told her to call [employer].   ULI what more we can do.         "

## 2024-04-23 ENCOUNTER — HOSPITAL ENCOUNTER (OUTPATIENT)
Dept: RADIOLOGY | Facility: CLINIC | Age: 59
Discharge: HOME | End: 2024-04-23
Payer: COMMERCIAL

## 2024-04-23 DIAGNOSIS — G89.4 CHRONIC PAIN SYNDROME: ICD-10-CM

## 2024-04-23 DIAGNOSIS — M50.00 CERVICAL DISC DISORDER WITH MYELOPATHY: ICD-10-CM

## 2024-04-23 DIAGNOSIS — M79.18 MYOFASCIAL PAIN SYNDROME: ICD-10-CM

## 2024-04-23 DIAGNOSIS — G44.86 CERVICOGENIC HEADACHE: ICD-10-CM

## 2024-04-23 DIAGNOSIS — G43.909 MIGRAINE SYNDROME: ICD-10-CM

## 2024-04-23 DIAGNOSIS — M17.0 PRIMARY LOCALIZED OSTEOARTHRITIS OF KNEES, BILATERAL: ICD-10-CM

## 2024-04-23 PROCEDURE — 72195 MRI PELVIS W/O DYE: CPT

## 2024-04-23 PROCEDURE — 72141 MRI NECK SPINE W/O DYE: CPT

## 2024-04-23 PROCEDURE — 72195 MRI PELVIS W/O DYE: CPT | Performed by: RADIOLOGY

## 2024-04-23 PROCEDURE — 72141 MRI NECK SPINE W/O DYE: CPT | Performed by: RADIOLOGY

## 2024-04-24 ENCOUNTER — TELEPHONE (OUTPATIENT)
Dept: OPERATING ROOM | Facility: CLINIC | Age: 59
End: 2024-04-24
Payer: COMMERCIAL

## 2024-04-24 DIAGNOSIS — G44.86 CERVICOGENIC HEADACHE: ICD-10-CM

## 2024-04-24 DIAGNOSIS — M17.0 PRIMARY LOCALIZED OSTEOARTHRITIS OF KNEES, BILATERAL: ICD-10-CM

## 2024-04-24 DIAGNOSIS — M50.00 CERVICAL DISC DISORDER WITH MYELOPATHY: ICD-10-CM

## 2024-04-24 DIAGNOSIS — M79.18 MYOFASCIAL PAIN SYNDROME: ICD-10-CM

## 2024-04-24 RX ORDER — OXYCODONE AND ACETAMINOPHEN 5; 325 MG/1; MG/1
2 TABLET ORAL EVERY 6 HOURS PRN
Qty: 120 TABLET | Refills: 0 | Status: SHIPPED | OUTPATIENT
Start: 2024-04-24 | End: 2024-05-13 | Stop reason: SDUPTHER

## 2024-04-24 RX ORDER — OXYCODONE AND ACETAMINOPHEN 5; 325 MG/1; MG/1
2 TABLET ORAL EVERY 6 HOURS PRN
Qty: 120 TABLET | Refills: 0 | Status: SHIPPED | OUTPATIENT
Start: 2024-04-26 | End: 2024-04-24 | Stop reason: SDUPTHER

## 2024-04-24 NOTE — TELEPHONE ENCOUNTER
D/w pt.  MRI pelvis shows fragility (insufficiency - type) fracture in Rt sacrum and pubic ramus, acute. She can't remember which injury but donal has had for a few weeks.  [facility]ocet early (8/d for 2 week) STRONGLY urged pt to taper back off on prednisone to allow fx healing and prepare for neck surgery - she agrees to make surgical c/s asap.  Still no overt red flags/myelopathic sx.  Urged in-person eval at next apt.  She'll try to get ride, and pickup neck brace before then if able.   Jen and Josefa - nothing to do , just FYI. She's really hurting but we need to encourage her to help herself get better per above plan.

## 2024-04-24 NOTE — TELEPHONE ENCOUNTER
Calling for MRI results    Said she was so happy she [facility] to the [facility]. The tech was fantastic. She even told the supervisor how good she was.     She needs a new soft collar. She can't sleep and is old and worn out. She is wearing an old collar backwards

## 2024-04-25 ENCOUNTER — OFFICE VISIT (OUTPATIENT)
Dept: ORTHOPEDIC SURGERY | Facility: CLINIC | Age: 59
End: 2024-04-25
Payer: COMMERCIAL

## 2024-04-25 DIAGNOSIS — M79.18 MYOFASCIAL PAIN SYNDROME: ICD-10-CM

## 2024-04-25 DIAGNOSIS — M17.0 PRIMARY LOCALIZED OSTEOARTHRITIS OF KNEES, BILATERAL: ICD-10-CM

## 2024-04-25 DIAGNOSIS — G43.909 MIGRAINE SYNDROME: ICD-10-CM

## 2024-04-25 DIAGNOSIS — G89.4 CHRONIC PAIN SYNDROME: ICD-10-CM

## 2024-04-25 DIAGNOSIS — F11.20 OPIOID DEPENDENCE WITH CURRENT USE (MULTI): ICD-10-CM

## 2024-04-25 DIAGNOSIS — G44.86 CERVICOGENIC HEADACHE: ICD-10-CM

## 2024-04-25 DIAGNOSIS — M50.00 CERVICAL DISC DISORDER WITH MYELOPATHY: ICD-10-CM

## 2024-04-25 PROCEDURE — 99215 OFFICE O/P EST HI 40 MIN: CPT | Performed by: PHYSICAL MEDICINE & REHABILITATION

## 2024-04-25 RX ORDER — LORAZEPAM 0.5 MG/1
TABLET ORAL
Qty: 90 TABLET | Refills: 0 | Status: SHIPPED | OUTPATIENT
Start: 2024-04-25 | End: 2024-06-11 | Stop reason: SDUPTHER

## 2024-04-25 RX ORDER — DIAZEPAM 5 MG/1
TABLET ORAL
Qty: 6 TABLET | Refills: 2 | Status: SHIPPED | OUTPATIENT
Start: 2024-04-25 | End: 2024-05-23 | Stop reason: SDUPTHER

## 2024-04-25 SDOH — SOCIAL STABILITY: SOCIAL NETWORK: SOCIAL ACTIVITY:: 10

## 2024-04-26 ENCOUNTER — TELEPHONE (OUTPATIENT)
Dept: OPERATING ROOM | Facility: CLINIC | Age: 59
End: 2024-04-26
Payer: COMMERCIAL

## 2024-04-26 NOTE — TELEPHONE ENCOUNTER
With the tailbone results should she still try yo walk as much as she can for the next 4-6 weeks?
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
## 2024-04-26 NOTE — TELEPHONE ENCOUNTER
No extra walking, only the minimal that she needs to do for functioning around the house. I'd say no more than 1 block walking per day outside

## 2024-04-29 ENCOUNTER — TELEPHONE (OUTPATIENT)
Dept: OPERATING ROOM | Facility: CLINIC | Age: 59
End: 2024-04-29
Payer: COMMERCIAL

## 2024-05-08 ENCOUNTER — APPOINTMENT (OUTPATIENT)
Dept: ORTHOPEDIC SURGERY | Facility: CLINIC | Age: 59
End: 2024-05-08
Payer: COMMERCIAL

## 2024-05-08 ENCOUNTER — TELEMEDICINE (OUTPATIENT)
Dept: ORTHOPEDIC SURGERY | Facility: CLINIC | Age: 59
End: 2024-05-08
Payer: COMMERCIAL

## 2024-05-08 DIAGNOSIS — G89.4 CHRONIC PAIN SYNDROME: ICD-10-CM

## 2024-05-08 DIAGNOSIS — M75.111 NONTRAUMATIC INCOMPLETE TEAR OF RIGHT ROTATOR CUFF: ICD-10-CM

## 2024-05-08 DIAGNOSIS — M17.0 PRIMARY OSTEOARTHRITIS OF BOTH KNEES: ICD-10-CM

## 2024-05-08 DIAGNOSIS — M50.00 CERVICAL DISC DISORDER WITH MYELOPATHY: Primary | ICD-10-CM

## 2024-05-08 PROCEDURE — 99215 OFFICE O/P EST HI 40 MIN: CPT | Performed by: PHYSICAL MEDICINE & REHABILITATION

## 2024-05-08 NOTE — PROGRESS NOTES
Neurogenic bladderVirtual visit check-in today, patient said overall pain is slowly improving, she started to wean prednisone, not noticed any increased weakness, gait is steady and has bowel or bladder disturbance but still has all the same problem.  Lengthy discussion about medications, she thinks she may have dropped her lost Percocet, says she only counted 18 left in the bottle but she agrees that this is difficult to believe that she went through almost 100 already.  She looked again.  Otherwise I reinforced the plan to see neurosurgery, and she requested my office call her again with contact number and send her hardcopy of MRI results.  I reassured her that these can be pushed from  to the [facility] surgeons office.  I deferred multiple questions about nonmusculoskeletal/pain related medical issues as usual to her primary for future discussion, and also told her that she should not let neurology consultation for headaches get in the way of her cervical spine treatment.  If there is delay getting her in with Dr. Jarrett at [facility] I offered to facilitate sooner spine surgery consult here, preferably in the next 3-4 weeks, finally encouraged her to follow-up here in person.    This was an extremely long rambling conversation, actually got disconnected after 26 minutes and had to call her back for another for 5 minutes, plus coordinating with discussion with our nurse staff here, and prescription management, at least 40 minutes total    See previous for details  Tesfaye Messina M.D, FAAPMR, R-MSK  Chief, Division of PM&R  Board Certified in PM&R, Sports Medicine and Musculoskeletal Ultrasound

## 2024-05-13 ENCOUNTER — TELEMEDICINE (OUTPATIENT)
Dept: ORTHOPEDIC SURGERY | Facility: CLINIC | Age: 59
End: 2024-05-13
Payer: COMMERCIAL

## 2024-05-13 ENCOUNTER — TELEPHONE (OUTPATIENT)
Dept: OPERATING ROOM | Facility: CLINIC | Age: 59
End: 2024-05-13

## 2024-05-13 DIAGNOSIS — G44.86 CERVICOGENIC HEADACHE: ICD-10-CM

## 2024-05-13 DIAGNOSIS — M50.00 CERVICAL DISC DISORDER WITH MYELOPATHY: ICD-10-CM

## 2024-05-13 DIAGNOSIS — M17.0 PRIMARY LOCALIZED OSTEOARTHRITIS OF KNEES, BILATERAL: ICD-10-CM

## 2024-05-13 DIAGNOSIS — M79.18 MYOFASCIAL PAIN SYNDROME: ICD-10-CM

## 2024-05-13 PROCEDURE — 99214 OFFICE O/P EST MOD 30 MIN: CPT | Performed by: PHYSICAL MEDICINE & REHABILITATION

## 2024-05-13 RX ORDER — OXYCODONE AND ACETAMINOPHEN 5; 325 MG/1; MG/1
2 TABLET ORAL EVERY 6 HOURS PRN
Qty: 240 TABLET | Refills: 0 | Status: SHIPPED | OUTPATIENT
Start: 2024-05-13 | End: 2024-05-29 | Stop reason: SDUPTHER

## 2024-05-13 NOTE — PROGRESS NOTES
Another exacerbation today, neighbors dog ran up to the fence right at her gait, did not make contact with her but somehow made her neck instantly get more painful.  She did not fall twist or bend or move any certain direction.  Earlier this past weekend she did have an episode where she actually bumped her head on the wall as she was trying to go around a door inside her house and it was dark.  No loss of consciousness fall twist or abnormal neck motion, but this may have contributed.  Basically the same pain, radicular, no loss of coordination, strength, or movement in upper or lower extremities.  Bladder still incontinent, bowel still with stoma.  Overall pain is just still severe.  Wants to continue same higher dose Percocet, and has gone back up on prednisone again against my advice.  I told her I am no longer managing prednisone she needs to discuss that with her primary doctor.  Otherwise reinforced plan to see spine surgery ASAP.  I gave her alternative names at [facility] if she is not able to get in with Dr.Ajit Engel who had seen her before.   Refilled percocet 240/mo. Strongly advised she wean if able    Total time greater than 20 minutes on the phone, greater than 30 minutes total  Tesfaye Messina M.D, FAAPMR, R-MSK  Chief, Division of PM&R  Board Certified in PM&R, Sports Medicine and Musculoskeletal Ultrasound

## 2024-05-13 NOTE — TELEPHONE ENCOUNTER
Said she got attacked by a dog over the weekend and reinjured left side of neck. Needs to speak to you.     Put in a virtual since it's a new injury?

## 2024-05-13 NOTE — TELEPHONE ENCOUNTER
Said new pharmacist is [facility].  They want to give her blue percocet, they only have 125 of the ones she normally gets.   He said she can get those but she'd lose the rest of the rx.    She said it's supposed to be in by 1:30 for delivery. She thought that you would want to put in a new rx like you normally do.  It's up to you.

## 2024-05-23 ENCOUNTER — TELEPHONE (OUTPATIENT)
Dept: OPERATING ROOM | Facility: CLINIC | Age: 59
End: 2024-05-23
Payer: COMMERCIAL

## 2024-05-23 DIAGNOSIS — G89.4 CHRONIC PAIN SYNDROME: ICD-10-CM

## 2024-05-23 DIAGNOSIS — G44.86 CERVICOGENIC HEADACHE: ICD-10-CM

## 2024-05-23 DIAGNOSIS — G43.909 MIGRAINE SYNDROME: ICD-10-CM

## 2024-05-23 DIAGNOSIS — F11.20 OPIOID DEPENDENCE WITH CURRENT USE (MULTI): ICD-10-CM

## 2024-05-23 DIAGNOSIS — M17.0 PRIMARY LOCALIZED OSTEOARTHRITIS OF KNEES, BILATERAL: ICD-10-CM

## 2024-05-23 DIAGNOSIS — M50.00 CERVICAL DISC DISORDER WITH MYELOPATHY: ICD-10-CM

## 2024-05-23 DIAGNOSIS — M79.18 MYOFASCIAL PAIN SYNDROME: ICD-10-CM

## 2024-05-23 RX ORDER — DIAZEPAM 5 MG/1
5 TABLET ORAL EVERY 12 HOURS PRN
Qty: 60 TABLET | Refills: 2 | Status: SHIPPED | OUTPATIENT
Start: 2024-05-23

## 2024-05-23 NOTE — TELEPHONE ENCOUNTER
Pharmacy won't fill Valium because it says only for MRI's. And Janel the pharmacist that you get along with is only in today. The one tomorrow you don't?    They don't have a normal rx for valium, just the one for mri - Janel says no one gets that many mri's, she needs a regular rx    The valium is really helping her come down off the prednisone.   She needs to come down off the prednisone.

## 2024-05-29 ENCOUNTER — TELEPHONE (OUTPATIENT)
Dept: OPERATING ROOM | Facility: CLINIC | Age: 59
End: 2024-05-29
Payer: COMMERCIAL

## 2024-05-29 DIAGNOSIS — M50.00 CERVICAL DISC DISORDER WITH MYELOPATHY: ICD-10-CM

## 2024-05-29 DIAGNOSIS — M17.0 PRIMARY LOCALIZED OSTEOARTHRITIS OF KNEES, BILATERAL: ICD-10-CM

## 2024-05-29 DIAGNOSIS — M79.18 MYOFASCIAL PAIN SYNDROME: ICD-10-CM

## 2024-05-29 DIAGNOSIS — G44.86 CERVICOGENIC HEADACHE: ICD-10-CM

## 2024-05-29 RX ORDER — OXYCODONE AND ACETAMINOPHEN 5; 325 MG/1; MG/1
2 TABLET ORAL EVERY 6 HOURS PRN
Qty: 240 TABLET | Refills: 0 | Status: SHIPPED | OUTPATIENT
Start: 2024-05-29 | End: 2024-06-28

## 2024-05-29 NOTE — TELEPHONE ENCOUNTER
Needs the rest of the percocet rx that she didn't get last time when they didn't have enough. Also needs to talk to you regarding Ativan and Valium. She doesn't need more just needs to talk to you about it.     I keep telling her to talk to Josefa. She said she left 2 messages today already. I told her she's with patients. Said she calls me because there are no arguments and it doesn't take days to get back. Again, told her she is with patients.     Could you please reiterate that she needs to call Josefa for rx's. Thanks.    [employer]cent

## 2024-05-29 NOTE — TELEPHONE ENCOUNTER
Rx sent, Jen sent separate message for me to call pt about Valium vs Ativan.  I'm swamped covering 2 inpatient locations now, will probably call her late this evening on my drive home

## 2024-05-30 NOTE — TELEPHONE ENCOUNTER
Spoke with pharmacist twice yesterday and pleaded with them to dispense 8 tab/day but she refused. Eventually at my request she d/w her supervisor and agreed to fill remainder of prior script ~#115 tabs with same sig 8d.  We both agreed to taper patient or get new MRI pelvis if not improving by then (next apt Juliana 10).    I called pt today and confirmed that plan.   Patient now says foot pains are too severe to wean meds, I said she'd need to see podiatry or ortho foot doc for that problem but she said too hard to make ti to apt.  Sounds like same foot/arch/joint pains for many years so I said I woudln't prescribe 8 oxy/day for that.     Also she wants to change back from diazepam to lorazepam due to sedation and ?less ostomy output.  I said OK but she should return unused diazepam tabs to pharmacy or our office.   She agreed and will keep apts  as planned

## 2024-06-10 ENCOUNTER — TELEMEDICINE (OUTPATIENT)
Dept: ORTHOPEDIC SURGERY | Facility: CLINIC | Age: 59
End: 2024-06-10
Payer: COMMERCIAL

## 2024-06-10 DIAGNOSIS — M79.18 MYOFASCIAL PAIN SYNDROME: ICD-10-CM

## 2024-06-10 DIAGNOSIS — G44.86 CERVICOGENIC HEADACHE: ICD-10-CM

## 2024-06-10 DIAGNOSIS — M17.0 PRIMARY OSTEOARTHRITIS OF BOTH KNEES: ICD-10-CM

## 2024-06-10 DIAGNOSIS — M84.48XD SACRAL INSUFFICIENCY FRACTURE WITH ROUTINE HEALING: ICD-10-CM

## 2024-06-10 DIAGNOSIS — F11.20 OPIOID DEPENDENCE WITH CURRENT USE (MULTI): ICD-10-CM

## 2024-06-10 DIAGNOSIS — G89.4 CHRONIC PAIN SYNDROME: Primary | ICD-10-CM

## 2024-06-10 DIAGNOSIS — M50.00 CERVICAL DISC DISORDER WITH MYELOPATHY: ICD-10-CM

## 2024-06-10 DIAGNOSIS — M17.0 PRIMARY LOCALIZED OSTEOARTHRITIS OF KNEES, BILATERAL: ICD-10-CM

## 2024-06-10 PROCEDURE — 99214 OFFICE O/P EST MOD 30 MIN: CPT | Performed by: PHYSICAL MEDICINE & REHABILITATION

## 2024-06-11 ENCOUNTER — TELEPHONE (OUTPATIENT)
Dept: OPERATING ROOM | Facility: CLINIC | Age: 59
End: 2024-06-11
Payer: COMMERCIAL

## 2024-06-11 PROBLEM — M84.48XD SACRAL INSUFFICIENCY FRACTURE WITH ROUTINE HEALING: Status: ACTIVE | Noted: 2024-06-11

## 2024-06-11 RX ORDER — LORAZEPAM 0.5 MG/1
TABLET ORAL
Qty: 90 TABLET | Refills: 0 | Status: SHIPPED | OUTPATIENT
Start: 2024-06-11
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
## 2024-06-11 NOTE — TELEPHONE ENCOUNTER
Said she spoke with you and you're ordering an MRI but she said she should also get one on her neck because of the dog? And the concussion? while she's in there anyway. Would like one ordered for that as well.

## 2024-06-12 NOTE — TELEPHONE ENCOUNTER
"No they're not necessary.   There was NO INJURY with the dog incident (it was just barking at her from opposite side of the fence and startled her).   And the \"concussion\" wasn't major trauma.  If she keeps pushing on the concussion symptoms remind her to see a neurologist again.   "

## 2024-07-01 ENCOUNTER — TELEPHONE (OUTPATIENT)
Dept: OPERATING ROOM | Facility: CLINIC | Age: 59
End: 2024-07-01
Payer: COMMERCIAL

## 2024-07-01 NOTE — TELEPHONE ENCOUNTER
D/w pt and son at length.  >20min phone call.  I reviewed [facility] chart also.  New /worse compression fx at L1 and L2 probably from her fall at home and probably explains pain. Today's incident was just nurses repositioning her in bed but sounds like she settled abruptly on her tailbone.  I told son to ask about kyphoplasty as I don't think she'll heal these fractures well on her own, and high pain levels will be very difficult for her to manage.  Otherwise care sounds appropriate and likely will need d/c to SNF.    She rambled tangentially about a lot of other issues, meds, etc but cognition seems intact, and it was good of her to try to manage without calling us at first.    No further action needed on our part, but I suspect she'll call again when she needs discharge planning
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
## 2024-07-02 ENCOUNTER — TELEPHONE (OUTPATIENT)
Dept: OPERATING ROOM | Facility: CLINIC | Age: 59
End: 2024-07-02
Payer: COMMERCIAL

## 2024-07-02 ENCOUNTER — TELEPHONE (OUTPATIENT)
Dept: ORTHOPEDICS | Facility: HOSPITAL | Age: 59
End: 2024-07-02
Payer: COMMERCIAL

## 2024-07-02 DIAGNOSIS — G44.86 CERVICOGENIC HEADACHE: ICD-10-CM

## 2024-07-02 DIAGNOSIS — M50.00 CERVICAL DISC DISORDER WITH MYELOPATHY: ICD-10-CM

## 2024-07-02 DIAGNOSIS — F11.20 OPIOID DEPENDENCE WITH CURRENT USE (MULTI): ICD-10-CM

## 2024-07-02 DIAGNOSIS — G89.4 CHRONIC PAIN SYNDROME: ICD-10-CM

## 2024-07-02 DIAGNOSIS — M79.18 MYOFASCIAL PAIN SYNDROME: ICD-10-CM

## 2024-07-02 DIAGNOSIS — M17.0 PRIMARY LOCALIZED OSTEOARTHRITIS OF KNEES, BILATERAL: ICD-10-CM

## 2024-07-02 DIAGNOSIS — S32.010A COMPRESSION FRACTURE OF L1 LUMBAR VERTEBRA, CLOSED, INITIAL ENCOUNTER (MULTI): Primary | ICD-10-CM

## 2024-07-02 DIAGNOSIS — S32.010A COMPRESSION FRACTURE OF L1 LUMBAR VERTEBRA, CLOSED, INITIAL ENCOUNTER (MULTI): ICD-10-CM

## 2024-07-02 RX ORDER — CYCLOBENZAPRINE HCL 10 MG
10 TABLET ORAL 3 TIMES DAILY PRN
Qty: 90 TABLET | Refills: 2 | Status: SHIPPED | OUTPATIENT
Start: 2024-07-02

## 2024-07-02 RX ORDER — OXYCODONE AND ACETAMINOPHEN 5; 325 MG/1; MG/1
2 TABLET ORAL EVERY 6 HOURS PRN
Qty: 240 TABLET | Refills: 0 | Status: SHIPPED | OUTPATIENT
Start: 2024-07-02 | End: 2024-07-02 | Stop reason: SDUPTHER

## 2024-07-02 RX ORDER — OXYCODONE AND ACETAMINOPHEN 5; 325 MG/1; MG/1
2 TABLET ORAL EVERY 6 HOURS PRN
Qty: 240 TABLET | Refills: 0 | Status: SHIPPED | OUTPATIENT
Start: 2024-07-02 | End: 2024-08-01

## 2024-07-02 RX ORDER — ACETAMINOPHEN AND CODEINE PHOSPHATE 300; 30 MG/1; MG/1
1 TABLET ORAL EVERY 6 HOURS PRN
Qty: 60 TABLET | Refills: 0 | Status: SHIPPED | OUTPATIENT
Start: 2024-07-02

## 2024-07-02 RX ORDER — LORAZEPAM 0.5 MG/1
TABLET ORAL
Qty: 120 TABLET | Refills: 0 | Status: SHIPPED | OUTPATIENT
Start: 2024-07-02

## 2024-07-02 RX ORDER — OXYCODONE AND ACETAMINOPHEN 5; 325 MG/1; MG/1
2 TABLET ORAL EVERY 6 HOURS PRN
Qty: 240 TABLET | Refills: 0 | Status: CANCELLED | OUTPATIENT
Start: 2024-07-02 | End: 2024-08-01

## 2024-07-02 NOTE — TELEPHONE ENCOUNTER
Pharmacist Tea is refusing to fill ANY of the meds anymore - too uncomfortable with high doses and interactions. Questioned my judgement and worried about her license.  I told her she was abandoning and endangering this medication- dependent patient and offered to cancel flexeril and T#3 rx and even prescribe less oxycodone but she still refused.   She recommend Allyson use the [facility] up the street.    I called patient and she asked to send rx to [facility].  Inc ativan per inpt psych request.  I also called Dr Mccormick office to discsus plan and left msg

## 2024-07-02 NOTE — TELEPHONE ENCOUNTER
Ok - percocet refill sent.   Pharmacy will probably question it because plan was to start taper. But can't now with new fractures L1 and L2.  I'll d/w pharmacist prn

## 2024-07-02 NOTE — TELEPHONE ENCOUNTER
They have not taken xray yet. If they don't she will need an order for xray.    She is possibly supposed to get out today or tomorrow and will need percocet refill. Due on the 28th.     She wants to know if you can call them in now so she has them whenever she gets out.     Wants me to call her back with answers on both.     I think she may have called Josefa as well. She said she did then said she didn't.

## 2024-07-08 ENCOUNTER — TELEPHONE (OUTPATIENT)
Dept: OPERATING ROOM | Facility: CLINIC | Age: 59
End: 2024-07-08
Payer: COMMERCIAL
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
## 2024-07-10 NOTE — TELEPHONE ENCOUNTER
"Tried calling pt yesterday but number provided didn't work.  She's now at a [facility], d/w her a length - feeling same, didn't get typical meds.   Josefa Figueroa - she said [facility] pharmacy only had the \"blue\" percocet.  Please call there to see if they'll order a different brand - the have the number \"512\" imprinted on the pill (white ones).  LMK.   Said she'd try [facility] or [facility] instead.   "

## 2024-07-11 NOTE — TELEPHONE ENCOUNTER
She called ans said she thought you or Josefa tried to call her. She was asking if you found the new rx she wanted. Her phone is [phone]   Skin normal color for race, warm, dry and intact. No evidence of rash.

## 2024-07-24 ENCOUNTER — TELEPHONE (OUTPATIENT)
Dept: OPERATING ROOM | Facility: CLINIC | Age: 59
End: 2024-07-24
Payer: COMMERCIAL

## 2024-07-24 ENCOUNTER — TELEPHONE (OUTPATIENT)
Dept: ORTHOPEDIC SURGERY | Facility: CLINIC | Age: 59
End: 2024-07-24
Payer: COMMERCIAL

## 2024-07-24 NOTE — TELEPHONE ENCOUNTER
Taryn called to state that she is in continuous pain all over her body, but her neck and back pain are out of control.  Patient was offered to see Dr. Messina in office for possible TPI, however, patient became very tearful and stated that she needed help now.  Patient is requesting that a pain medication injection be given to her while she is a resident of [facility].  Patient informed again that  does not have privileges at the [facility], and she stated to have Dr. Messina call her because he is aware of what injection she is talking about.   Dr. Messina notified.

## 2024-07-24 NOTE — TELEPHONE ENCOUNTER
[phone]    [facility] [phone] ROOM 108 cardiac rehab    Said she is the worst she's ever been since you've known her. She can't even scooch on the bed. Back, hip, T1,2,3 and neck muscles.    She wants to talk to you. And said the nurses that Jarret talked to didn't do anything that they talked about.

## 2024-08-02 ENCOUNTER — TELEPHONE (OUTPATIENT)
Dept: OPERATING ROOM | Facility: CLINIC | Age: 59
End: 2024-08-02
Payer: COMMERCIAL
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
## 2024-08-08 ENCOUNTER — TELEMEDICINE (OUTPATIENT)
Dept: ORTHOPEDIC SURGERY | Facility: CLINIC | Age: 59
End: 2024-08-08
Payer: COMMERCIAL
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
## 2024-08-12 ENCOUNTER — TELEPHONE (OUTPATIENT)
Dept: ORTHOPEDIC SURGERY | Facility: CLINIC | Age: 59
End: 2024-08-12
Payer: COMMERCIAL

## 2024-08-12 DIAGNOSIS — M50.00 CERVICAL DISC DISORDER WITH MYELOPATHY: ICD-10-CM

## 2024-08-12 DIAGNOSIS — M17.0 PRIMARY LOCALIZED OSTEOARTHRITIS OF KNEES, BILATERAL: ICD-10-CM

## 2024-08-12 DIAGNOSIS — G89.4 CHRONIC PAIN SYNDROME: Primary | ICD-10-CM

## 2024-08-12 RX ORDER — KETOROLAC TROMETHAMINE 10 MG/1
10 TABLET, FILM COATED ORAL EVERY 6 HOURS PRN
Qty: 20 TABLET | Refills: 0 | Status: SHIPPED | OUTPATIENT
Start: 2024-08-12 | End: 2024-08-17
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
## 2024-08-13 NOTE — TELEPHONE ENCOUNTER
Pt called back directly to my cell, requesting Toradol Rx faxed to [phone]  attn Gabrielle Cifuentes I printed Rx and will scan to your email to fax there, then please let [facility] know it was sent,  thanks

## 2024-09-16 ENCOUNTER — APPOINTMENT (OUTPATIENT)
Dept: WOUND CARE | Facility: CLINIC | Age: 59
End: 2024-09-16
Payer: COMMERCIAL

## 2024-09-17 ENCOUNTER — OFFICE VISIT (OUTPATIENT)
Dept: WOUND CARE | Facility: CLINIC | Age: 59
End: 2024-09-17
Payer: COMMERCIAL

## 2024-09-17 PROCEDURE — 11042 DBRDMT SUBQ TIS 1ST 20SQCM/<: CPT

## 2024-09-26 ENCOUNTER — TELEPHONE (OUTPATIENT)
Dept: ORTHOPEDIC SURGERY | Facility: CLINIC | Age: 59
End: 2024-09-26
Payer: COMMERCIAL

## 2024-09-26 NOTE — TELEPHONE ENCOUNTER
"Taryn called to update Dr. Messina with her lastest medical conditions.  She went to have a CT Scan of her lungs today due to \"spots\" on her lungs, but could not lie down due to her \"broken back\". She was referred to an Orthopedic Surgeon, Dr. Muro by Dr. Mccormick, however, Dr. Muro refused to see her due to established care with Dr. Messina.  Taryn is requesting a call back from Dr. Messina at [phone]  "

## 2024-09-27 ENCOUNTER — TELEPHONE (OUTPATIENT)
Dept: ORTHOPEDIC SURGERY | Facility: CLINIC | Age: 59
End: 2024-09-27
Payer: COMMERCIAL

## 2024-09-27 NOTE — TELEPHONE ENCOUNTER
I tried calling patient twice yesterday afternoon then again this afternoon - no answer and voicemail box not set up.      D/w nurse here yesterday - she was just calling to update status.  Doing OK.      Ill try again tomorrow or after weekend    ----- Message from Nurse Josefa FRANKEL sent at 9/26/2024  5:25 PM EDT -----  Regarding: Call back  Dr. Messina,  Just documented a phone call from Taryn.  Please return her call at [phone]  Thank you.  Josefa

## 2024-10-04 NOTE — TELEPHONE ENCOUNTER
Pt left message that her phone is [phone] which is what we have.  Said smiley have a cracked back and she just got s bone density test yesterday and  a CT scan of back from [email or web address] for the spots on her lungs and to see where she has the crack. She also got an xray.,    She still needs the Toradol that she was supposed to get before she went t [facility]. She said she doesn't have the fax number but will get it.

## 2024-10-23 ENCOUNTER — TELEPHONE (OUTPATIENT)
Dept: OPERATING ROOM | Facility: CLINIC | Age: 59
End: 2024-10-23
Payer: COMMERCIAL

## 2024-10-23 DIAGNOSIS — M79.18 MYOFASCIAL PAIN SYNDROME: ICD-10-CM

## 2024-10-23 DIAGNOSIS — S32.010A COMPRESSION FRACTURE OF L1 LUMBAR VERTEBRA, CLOSED, INITIAL ENCOUNTER (MULTI): ICD-10-CM

## 2024-10-23 DIAGNOSIS — M17.0 PRIMARY LOCALIZED OSTEOARTHRITIS OF KNEES, BILATERAL: ICD-10-CM

## 2024-10-23 DIAGNOSIS — M50.00 CERVICAL DISC DISORDER WITH MYELOPATHY: ICD-10-CM

## 2024-10-23 DIAGNOSIS — G44.86 CERVICOGENIC HEADACHE: ICD-10-CM

## 2024-10-23 NOTE — TELEPHONE ENCOUNTER
Dr Mccormick her pcp would like to speak with you. Said she just got out of the rehab facility and is asking for her pain med refills. Please call Dr. Mccormick' cell [phone]
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
## 2024-10-25 RX ORDER — CYCLOBENZAPRINE HCL 10 MG
10 TABLET ORAL 3 TIMES DAILY PRN
Qty: 90 TABLET | Refills: 2 | Status: SHIPPED | OUTPATIENT
Start: 2024-10-25

## 2024-10-25 RX ORDER — MELOXICAM 7.5 MG/1
7.5 TABLET ORAL DAILY
Qty: 30 TABLET | Refills: 11 | Status: SHIPPED | OUTPATIENT
Start: 2024-10-25 | End: 2025-10-25

## 2024-10-25 NOTE — TELEPHONE ENCOUNTER
Long d/w pt son He'll get her to office for an exam with me on Thurs - I suggested late morning but anytime of day OK except 1pm (double-booked then).     Jen or Josefa - please add on - he'll call Monday .     She's staying with ex  now and plans to move into [address] level [address] when house sold.  Doing very well , just generalized pain but sugars controlled and blood counts normalized ( I reviewed).  She's weaned off of oxycodone almost completely and they think she's actually doing better with pain.  We decided to try to keep her off but allow cyclobenzaprine and I'll add meloxicam now that medically more stable. Also awaiting psych appt - we discussed SNRI meds for pain but I'd need psych or PCP to prescribe

## 2024-10-28 ENCOUNTER — TELEPHONE (OUTPATIENT)
Dept: ORTHOPEDIC SURGERY | Facility: HOSPITAL | Age: 59
End: 2024-10-28
Payer: COMMERCIAL

## 2024-10-31 ENCOUNTER — OFFICE VISIT (OUTPATIENT)
Dept: ORTHOPEDIC SURGERY | Facility: CLINIC | Age: 59
End: 2024-10-31
Payer: COMMERCIAL

## 2024-10-31 DIAGNOSIS — M50.00 CERVICAL DISC DISORDER WITH MYELOPATHY: ICD-10-CM

## 2024-10-31 DIAGNOSIS — S32.000G LUMBAR COMPRESSION FRACTURE, WITH DELAYED HEALING, SUBSEQUENT ENCOUNTER: ICD-10-CM

## 2024-10-31 DIAGNOSIS — G89.4 CHRONIC PAIN SYNDROME: Primary | ICD-10-CM

## 2024-10-31 PROCEDURE — 99214 OFFICE O/P EST MOD 30 MIN: CPT | Performed by: PHYSICAL MEDICINE & REHABILITATION

## 2024-10-31 RX ORDER — KETOROLAC TROMETHAMINE 10 MG/1
10 TABLET, FILM COATED ORAL EVERY 6 HOURS PRN
Qty: 20 TABLET | Refills: 0 | Status: SHIPPED | OUTPATIENT
Start: 2024-10-31 | End: 2024-11-05

## 2024-10-31 RX ORDER — DIAZEPAM 5 MG/1
TABLET ORAL
Qty: 2 TABLET | Refills: 2 | OUTPATIENT
Start: 2024-10-31

## 2024-10-31 RX ORDER — TIZANIDINE 2 MG/1
2 TABLET ORAL EVERY 6 HOURS PRN
Qty: 30 TABLET | Refills: 0 | Status: SHIPPED | OUTPATIENT
Start: 2024-10-31 | End: 2024-11-10

## 2024-10-31 SDOH — SOCIAL STABILITY: SOCIAL NETWORK: SOCIAL ACTIVITY:: 8

## 2024-11-05 ENCOUNTER — HOSPITAL ENCOUNTER (OUTPATIENT)
Dept: RADIOLOGY | Facility: EXTERNAL LOCATION | Age: 59
Discharge: HOME | End: 2024-11-05

## 2024-11-18 ENCOUNTER — APPOINTMENT (OUTPATIENT)
Dept: RADIOLOGY | Facility: CLINIC | Age: 59
End: 2024-11-18
Payer: COMMERCIAL

## 2024-11-18 ENCOUNTER — HOSPITAL ENCOUNTER (OUTPATIENT)
Dept: RADIOLOGY | Facility: CLINIC | Age: 59
End: 2024-11-18
Payer: COMMERCIAL

## 2024-11-26 ENCOUNTER — TELEPHONE (OUTPATIENT)
Dept: ORTHOPEDIC SURGERY | Facility: CLINIC | Age: 59
End: 2024-11-26
Payer: COMMERCIAL

## 2024-11-26 DIAGNOSIS — S32.000G LUMBAR COMPRESSION FRACTURE, WITH DELAYED HEALING, SUBSEQUENT ENCOUNTER: ICD-10-CM

## 2024-11-26 DIAGNOSIS — M50.00 CERVICAL DISC DISORDER WITH MYELOPATHY: ICD-10-CM

## 2024-11-26 DIAGNOSIS — G89.4 CHRONIC PAIN SYNDROME: ICD-10-CM

## 2024-11-26 RX ORDER — DIAZEPAM 5 MG/1
TABLET ORAL
Qty: 2 TABLET | Refills: 1 | Status: SHIPPED | OUTPATIENT
Start: 2024-11-26

## 2024-11-26 NOTE — TELEPHONE ENCOUNTER
Rx Valium send for MRI pre-sedation.   I sent to [facility] in [place] (that was her last pharmacy on file).    Josefa please let them ([facility]) know

## 2024-11-29 ENCOUNTER — HOSPITAL ENCOUNTER (OUTPATIENT)
Dept: RADIOLOGY | Facility: CLINIC | Age: 59
Discharge: HOME | End: 2024-11-29
Payer: COMMERCIAL

## 2024-11-29 DIAGNOSIS — G89.4 CHRONIC PAIN SYNDROME: ICD-10-CM

## 2024-11-29 DIAGNOSIS — S32.000G LUMBAR COMPRESSION FRACTURE, WITH DELAYED HEALING, SUBSEQUENT ENCOUNTER: ICD-10-CM

## 2024-11-29 DIAGNOSIS — M50.00 CERVICAL DISC DISORDER WITH MYELOPATHY: ICD-10-CM

## 2024-11-29 PROCEDURE — 72146 MRI CHEST SPINE W/O DYE: CPT

## 2024-11-29 PROCEDURE — 72148 MRI LUMBAR SPINE W/O DYE: CPT

## 2024-12-05 ENCOUNTER — TELEPHONE (OUTPATIENT)
Dept: OPERATING ROOM | Facility: CLINIC | Age: 59
End: 2024-12-05
Payer: COMMERCIAL

## 2024-12-07 NOTE — TELEPHONE ENCOUNTER
Josefa - please add her on as overbook at [place] on Monday 2:30-3pm.  I dont think Jen can overbook.   No need to call patient.     I called her last night on way home - she hadn't heard that I wanted to see her.  Offered to squeeze her in Monday afternoon (noon-3pm) or Thurs at [facility] - she'll check on ride.    - I told her I really need to examine her to see of the T12 fx fits with her back pain.  If so maybe we can do a CASH brace or custom corset? To work around her stoma.     She also said new osteoporosis med (Evenity) causing even more aches and pains so asked for more pain meds again, but she didn't push very hard.  I said we'll discuss non-narcotic options when I see her.    Also said she needs to d/w doctor who prescribes it - looks like Dr Jamaica Barnes at [facility] but she probably needs to keep taking it.

## 2024-12-09 ENCOUNTER — OFFICE VISIT (OUTPATIENT)
Dept: ORTHOPEDIC SURGERY | Facility: CLINIC | Age: 59
End: 2024-12-09
Payer: COMMERCIAL

## 2024-12-09 DIAGNOSIS — S22.000D THORACIC COMPRESSION FRACTURE, WITH ROUTINE HEALING, SUBSEQUENT ENCOUNTER: Primary | ICD-10-CM

## 2024-12-09 DIAGNOSIS — S32.010A COMPRESSION FRACTURE OF L1 LUMBAR VERTEBRA, CLOSED, INITIAL ENCOUNTER (MULTI): ICD-10-CM

## 2024-12-09 DIAGNOSIS — S32.000G LUMBAR COMPRESSION FRACTURE, WITH DELAYED HEALING, SUBSEQUENT ENCOUNTER: ICD-10-CM

## 2024-12-09 DIAGNOSIS — M50.00 CERVICAL DISC DISORDER WITH MYELOPATHY: ICD-10-CM

## 2024-12-09 DIAGNOSIS — M50.00 HERNIATION OF INTERVERTEBRAL DISC OF CERVICAL SPINE WITH MYELOPATHY: ICD-10-CM

## 2024-12-09 DIAGNOSIS — G44.86 CERVICOGENIC HEADACHE: ICD-10-CM

## 2024-12-09 DIAGNOSIS — M84.48XD SACRAL INSUFFICIENCY FRACTURE WITH ROUTINE HEALING: ICD-10-CM

## 2024-12-09 DIAGNOSIS — M79.18 MYOFASCIAL PAIN SYNDROME: ICD-10-CM

## 2024-12-09 DIAGNOSIS — M17.0 PRIMARY LOCALIZED OSTEOARTHRITIS OF KNEES, BILATERAL: ICD-10-CM

## 2024-12-09 PROCEDURE — 99214 OFFICE O/P EST MOD 30 MIN: CPT | Performed by: PHYSICAL MEDICINE & REHABILITATION

## 2024-12-09 RX ORDER — CYCLOBENZAPRINE HCL 10 MG
10 TABLET ORAL 3 TIMES DAILY PRN
Qty: 90 TABLET | Refills: 2 | Status: SHIPPED | OUTPATIENT
Start: 2024-12-09

## 2024-12-09 ASSESSMENT — PAIN - FUNCTIONAL ASSESSMENT: PAIN_FUNCTIONAL_ASSESSMENT: 0-10

## 2024-12-09 ASSESSMENT — PAIN SCALES - GENERAL: PAINLEVEL_OUTOF10: 9

## 2024-12-09 ASSESSMENT — PAIN DESCRIPTION - DESCRIPTORS: DESCRIPTORS: ACHING;SHOOTING;SHARP
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
## 2025-01-09 ENCOUNTER — APPOINTMENT (OUTPATIENT)
Dept: ORTHOPEDIC SURGERY | Facility: CLINIC | Age: 60
End: 2025-01-09
Payer: COMMERCIAL